# Patient Record
Sex: FEMALE | NOT HISPANIC OR LATINO
[De-identification: names, ages, dates, MRNs, and addresses within clinical notes are randomized per-mention and may not be internally consistent; named-entity substitution may affect disease eponyms.]

---

## 2022-04-20 PROBLEM — Z00.00 ENCOUNTER FOR PREVENTIVE HEALTH EXAMINATION: Status: ACTIVE | Noted: 2022-04-20

## 2022-04-22 ENCOUNTER — APPOINTMENT (OUTPATIENT)
Dept: ENDOCRINOLOGY | Facility: CLINIC | Age: 37
End: 2022-04-22

## 2022-05-02 ENCOUNTER — APPOINTMENT (OUTPATIENT)
Dept: ENDOCRINOLOGY | Facility: CLINIC | Age: 37
End: 2022-05-02
Payer: COMMERCIAL

## 2022-05-02 ENCOUNTER — RESULT CHARGE (OUTPATIENT)
Age: 37
End: 2022-05-02

## 2022-05-02 VITALS
HEIGHT: 59 IN | OXYGEN SATURATION: 98 % | DIASTOLIC BLOOD PRESSURE: 62 MMHG | HEART RATE: 65 BPM | BODY MASS INDEX: 27.21 KG/M2 | SYSTOLIC BLOOD PRESSURE: 104 MMHG | WEIGHT: 135 LBS

## 2022-05-02 LAB — GLUCOSE BLDC GLUCOMTR-MCNC: 86

## 2022-05-02 PROCEDURE — 82962 GLUCOSE BLOOD TEST: CPT

## 2022-05-02 PROCEDURE — 99203 OFFICE O/P NEW LOW 30 MIN: CPT | Mod: 25

## 2022-05-02 RX ORDER — PNV NO.95/FERROUS FUM/FOLIC AC 28MG-0.8MG
TABLET ORAL
Refills: 0 | Status: ACTIVE | COMMUNITY

## 2022-05-02 RX ORDER — LANCETS 33 GAUGE
EACH MISCELLANEOUS
Qty: 400 | Refills: 0 | Status: ACTIVE | COMMUNITY
Start: 2022-05-02 | End: 1900-01-01

## 2022-05-02 RX ORDER — GLUC/MSM/COLGN2/HYAL/ANTIARTH3 375-375-20
TABLET ORAL
Refills: 0 | Status: ACTIVE | COMMUNITY

## 2022-05-02 RX ORDER — BLOOD SUGAR DIAGNOSTIC
STRIP MISCELLANEOUS 4 TIMES DAILY
Qty: 400 | Refills: 1 | Status: ACTIVE | COMMUNITY
Start: 2022-05-02 | End: 1900-01-01

## 2022-05-02 RX ORDER — ADHESIVE TAPE 3"X 2.3 YD
50 MCG TAPE, NON-MEDICATED TOPICAL
Refills: 0 | Status: ACTIVE | COMMUNITY

## 2022-05-02 NOTE — PHYSICAL EXAM
[Alert] : alert [Normal Sclera/Conjunctiva] : normal sclera/conjunctiva [Normal Hearing] : hearing was normal [No Neck Mass] : no neck mass was observed [Thyroid Not Enlarged] : the thyroid was not enlarged [No Respiratory Distress] : no respiratory distress [No Accessory Muscle Use] : no accessory muscle use [Clear to Auscultation] : lungs were clear to auscultation bilaterally [Normal PMI] : the apical impulse was normal [Normal S1, S2] : normal S1 and S2 [Normal Rate] : heart rate was normal [No Stigmata of Cushings Syndrome] : no stigmata of Cushings Syndrome [Normal Gait] : normal gait [Oriented x3] : oriented to person, place, and time

## 2022-05-02 NOTE — REVIEW OF SYSTEMS
[Recent Weight Gain (___ Lbs)] : recent weight gain: [unfilled] lbs [Fatigue] : no fatigue [Visual Field Defect] : no visual field defect [Blurred Vision] : no blurred vision [Neck Pain] : no neck pain [Chest Pain] : no chest pain [Palpitations] : no palpitations [Shortness Of Breath] : no shortness of breath [Nausea] : no nausea [Constipation] : no constipation [Vomiting] : no vomiting [Diarrhea] : no diarrhea [Polydipsia] : no polydipsia

## 2022-05-02 NOTE — HISTORY OF PRESENT ILLNESS
[FreeTextEntry1] : CNA - working nights, 11p-7AM, [employer] , in nursing school now \par Tries to sleep 9-4p, when not in school. When has classes sleep 12-6PM \par \par Diagnosed:\par Onset:\par Family History: none\par Ethnicity: White \par \par 2 Hour GTT\par Fasting 82\par 1 Hour 173\par 2 Hour 155\par \par \par SMBG\par none\par \par In Office: FS: 88, fasting \par \par Current drug regimen:\par PNV\par Vit D \par Iron \par \par Weight: 20 pounds this pregnancy \par Diet: eats a lot of carbs \par B- was eating cereal, oatmeal toast before diagnosis, now hard boiled eggs \par L- was eating pb&J, grilled cheese, Now salads, wraps\par D- chicken parm, steak, veggie\par Snacks- carrots and ranch, pears, almonds \par Drink-water\par Exercise: physical at work, walking moving patients \par Smoking: none \par \par OB: Jazmin Lea\par LMP: 10/15/21\par HAKEEM:22\par Weeks: 28 weeks\par  \par \par Sonograms:bi-lobe placenta at the top- [facility], 22 weeks sono, measuring average \par \par Plan to Breastfeed: yes\par

## 2022-05-04 ENCOUNTER — APPOINTMENT (OUTPATIENT)
Dept: ENDOCRINOLOGY | Facility: CLINIC | Age: 37
End: 2022-05-04
Payer: COMMERCIAL

## 2022-05-04 PROCEDURE — G0108 DIAB MANAGE TRN  PER INDIV: CPT

## 2022-05-04 RX ORDER — URINE ACETONE TEST STRIPS
STRIP MISCELLANEOUS
Qty: 1 | Refills: 1 | Status: ACTIVE | COMMUNITY
Start: 2022-05-04 | End: 1900-01-01

## 2022-05-16 ENCOUNTER — APPOINTMENT (OUTPATIENT)
Dept: ENDOCRINOLOGY | Facility: CLINIC | Age: 37
End: 2022-05-16
Payer: COMMERCIAL

## 2022-05-16 VITALS
OXYGEN SATURATION: 99 % | SYSTOLIC BLOOD PRESSURE: 112 MMHG | HEART RATE: 63 BPM | DIASTOLIC BLOOD PRESSURE: 64 MMHG | HEIGHT: 59 IN | BODY MASS INDEX: 26.21 KG/M2 | WEIGHT: 130 LBS

## 2022-05-16 LAB — GLUCOSE BLDC GLUCOMTR-MCNC: 88

## 2022-05-16 PROCEDURE — 99213 OFFICE O/P EST LOW 20 MIN: CPT | Mod: 25

## 2022-05-16 PROCEDURE — 82962 GLUCOSE BLOOD TEST: CPT

## 2022-05-16 PROCEDURE — 36415 COLL VENOUS BLD VENIPUNCTURE: CPT

## 2022-05-16 NOTE — ASSESSMENT
[FreeTextEntry1] : GDM:\par -Continue to test glucose 4x's a day\par -Goal for blood glucose levels: in AM Fating <90 and one hour after meals <120\par -Check for urine ketones in the AM\par -MUST send logs at least once a week, importance emphasized \par -Reviewed diet and snack options\par -Discussed importance of having sugar at goal\par -Discussed if diet and exercise changes does nto control sugar\par -Declines starting NPH at night, at this time, will send logs on Thursday and reevaluate \par -Get urine keto strips from [employer] and start checking urine in the AM \par \par RTO in 2 weeks with SANDIE\par RTO in 4 weeks with NP [Self Monitoring of Blood Glucose] : self monitoring of blood glucose

## 2022-05-16 NOTE — HISTORY OF PRESENT ILLNESS
[FreeTextEntry1] : CNA - working nights, 11p-7AM, [employer] , in nursing school now \par Tries to sleep 9-4p, when not in school. When has classes sleep 12-6PM \par she has not been sending in logs \par \par Diagnosed: GDM\par Onset: 2Hr GTT\par Family History: none\par Ethnicity: White \par \par 2 Hour GTT\par Fasting 82\par 1 Hour 173\par 2 Hour 155\par \par \par SMBG\par 4x's a day - some logs in phone\par AM fasting 85, 99, 94\par 1 Hour after breakfast 116,130, 103\par 1 Hour after Lunch 98, 97, 103, 80\par 1 Hour After [id number], 100, 111, 93, 97\par \par In Office: FS:89, fasting  \par \par Current drug regimen:\par PNV\par Vit D \par Iron \par \par Weight: 20 pounds this pregnancy \par Diet: changed diet since last visit, eating less carbs now \par B- egg whites, spinach, cheese \par L- chicken salad, greek food, peanut butter\par D- chicken parm, steak, veggie\par Snacks- carrots and ranch, pears, almonds \par Drink-water\par Exercise: physical at work, walking moving patients \par Smoking: none \par \par OB: Jazmin Lea\par LMP: 10/15/21\par HAKEEM:22\par Weeks: 30 weeks\par  \par \par Sonograms:bi-lobe placenta at the top- [facility], 22 weeks sono, measuring average \par 2 weeks ago --> measuring average 2 pounds 10 ounces \par \par Plan to Breastfeed: yes\par

## 2022-05-16 NOTE — REVIEW OF SYSTEMS
[Fatigue] : no fatigue [Recent Weight Gain (___ Lbs)] : no recent weight gain [Recent Weight Loss (___ Lbs)] : no recent weight loss [Visual Field Defect] : no visual field defect [Blurred Vision] : no blurred vision [Dysphagia] : no dysphagia [Chest Pain] : no chest pain [Palpitations] : no palpitations [Shortness Of Breath] : no shortness of breath [Nausea] : no nausea [Constipation] : no constipation [Vomiting] : no vomiting [Diarrhea] : no diarrhea [Polyuria] : no polyuria [Polydipsia] : no polydipsia

## 2022-05-17 LAB
ALBUMIN SERPL ELPH-MCNC: 3.5 G/DL
ALP BLD-CCNC: 61 U/L
ALT SERPL-CCNC: 17 U/L
ANION GAP SERPL CALC-SCNC: 13 MMOL/L
AST SERPL-CCNC: 20 U/L
BASOPHILS # BLD AUTO: 0.04 K/UL
BASOPHILS NFR BLD AUTO: 0.6 %
BILIRUB SERPL-MCNC: <0.2 MG/DL
BUN SERPL-MCNC: 11 MG/DL
CALCIUM SERPL-MCNC: 8.7 MG/DL
CHLORIDE SERPL-SCNC: 103 MMOL/L
CO2 SERPL-SCNC: 18 MMOL/L
CREAT SERPL-MCNC: 0.47 MG/DL
EGFR: 128 ML/MIN/1.73M2
EOSINOPHIL # BLD AUTO: 0.06 K/UL
EOSINOPHIL NFR BLD AUTO: 0.9 %
ESTIMATED AVERAGE GLUCOSE: 94 MG/DL
GLUCOSE SERPL-MCNC: 83 MG/DL
HBA1C MFR BLD HPLC: 4.9 %
HCT VFR BLD CALC: 35.9 %
HGB BLD-MCNC: 11.6 G/DL
IMM GRANULOCYTES NFR BLD AUTO: 1.2 %
LYMPHOCYTES # BLD AUTO: 1.45 K/UL
LYMPHOCYTES NFR BLD AUTO: 20.9 %
MAN DIFF?: NORMAL
MCHC RBC-ENTMCNC: 29.2 PG
MCHC RBC-ENTMCNC: 32.3 GM/DL
MCV RBC AUTO: 90.4 FL
MONOCYTES # BLD AUTO: 0.58 K/UL
MONOCYTES NFR BLD AUTO: 8.4 %
NEUTROPHILS # BLD AUTO: 4.72 K/UL
NEUTROPHILS NFR BLD AUTO: 68 %
PLATELET # BLD AUTO: 262 K/UL
POTASSIUM SERPL-SCNC: 4 MMOL/L
PROT SERPL-MCNC: 6 G/DL
RBC # BLD: 3.97 M/UL
RBC # FLD: 13.2 %
SODIUM SERPL-SCNC: 135 MMOL/L
T3FREE SERPL-MCNC: 1.79 PG/ML
T4 FREE SERPL-MCNC: 0.9 NG/DL
TSH SERPL-ACNC: 0.73 UIU/ML
WBC # FLD AUTO: 6.93 K/UL

## 2022-06-01 ENCOUNTER — APPOINTMENT (OUTPATIENT)
Dept: ENDOCRINOLOGY | Facility: CLINIC | Age: 37
End: 2022-06-01
Payer: COMMERCIAL

## 2022-06-01 DIAGNOSIS — O24.419 GESTATIONAL DIABETES MELLITUS IN PREGNANCY, UNSPECIFIED CONTROL: ICD-10-CM

## 2022-06-01 PROCEDURE — G0108 DIAB MANAGE TRN  PER INDIV: CPT

## 2022-06-17 ENCOUNTER — APPOINTMENT (OUTPATIENT)
Dept: ENDOCRINOLOGY | Facility: CLINIC | Age: 37
End: 2022-06-17

## 2022-06-20 ENCOUNTER — OUTPATIENT (OUTPATIENT)
Dept: OUTPATIENT SERVICES | Facility: HOSPITAL | Age: 37
LOS: 1 days | End: 2022-06-20

## 2022-06-20 DIAGNOSIS — Z11.52 ENCOUNTER FOR SCREENING FOR COVID-19: ICD-10-CM

## 2022-06-28 ENCOUNTER — APPOINTMENT (OUTPATIENT)
Dept: ENDOCRINOLOGY | Facility: CLINIC | Age: 37
End: 2022-06-28

## 2022-06-28 ENCOUNTER — NON-APPOINTMENT (OUTPATIENT)
Age: 37
End: 2022-06-28

## 2023-03-20 NOTE — ASSESSMENT
Goal Outcome Evaluation:      Patient is resting in bed, alert and orientedx4. No s/s of pain or SOB noted at this time. Blood pressure was lower at the beginning of shift, Midodrine ordered, effective. Patient still did not have bowel movement for few days, was ordered Miralax powder per SEB Watson. Call light in reach            [FreeTextEntry1] : GDM:\par -Start to test glucose 4x's a day\par -Goal for blood glucose levels: in AM Fating <90 and one hour after meals <120\par -Check for urine ketones in the AM\par -Send logs once a week through patient portal\par -Reviewed diet and snack options\par -GDM packet given today\par -Discussed importance of having sugar at goal\par -Discussed if diet and exercise changes does nto control sugar, we would need to start insulin \par -Meet with CDE in 2 day for more detail on diet-> Bring log sheets\par \par \par RTO in 2 weeks with NP\par RTO in 4 weeks with CDE